# Patient Record
Sex: MALE | Race: WHITE | NOT HISPANIC OR LATINO | URBAN - METROPOLITAN AREA
[De-identification: names, ages, dates, MRNs, and addresses within clinical notes are randomized per-mention and may not be internally consistent; named-entity substitution may affect disease eponyms.]

---

## 2024-08-21 ENCOUNTER — APPOINTMENT (OUTPATIENT)
Dept: OPHTHALMOLOGY | Facility: CLINIC | Age: 44
End: 2024-08-21

## 2024-08-21 ENCOUNTER — PREP FOR PROCEDURE (OUTPATIENT)
Dept: OPHTHALMOLOGY | Facility: CLINIC | Age: 44
End: 2024-08-21

## 2024-08-21 DIAGNOSIS — H50.00 ESOTROPIA OF BOTH EYES: Primary | ICD-10-CM

## 2024-08-21 DIAGNOSIS — H50.00 ESOTROPIA: Primary | ICD-10-CM

## 2024-08-21 DIAGNOSIS — H50.9 UNSPECIFIED STRABISMUS: ICD-10-CM

## 2024-08-21 DIAGNOSIS — Z98.890 HISTORY OF STRABISMUS SURGERY: ICD-10-CM

## 2024-08-21 PROCEDURE — 92060 SENSORIMOTOR EXAMINATION: CPT | Performed by: OPHTHALMOLOGY

## 2024-08-21 PROCEDURE — 99204 OFFICE O/P NEW MOD 45 MIN: CPT | Performed by: OPHTHALMOLOGY

## 2024-08-21 RX ORDER — MINOXIDIL 2.5 MG/1
2.5 TABLET ORAL DAILY
COMMUNITY

## 2024-08-21 RX ORDER — CETIRIZINE HYDROCHLORIDE 5 MG/1
TABLET, CHEWABLE ORAL DAILY
COMMUNITY

## 2024-08-21 RX ORDER — SODIUM CHLORIDE, SODIUM LACTATE, POTASSIUM CHLORIDE, CALCIUM CHLORIDE 600; 310; 30; 20 MG/100ML; MG/100ML; MG/100ML; MG/100ML
20 INJECTION, SOLUTION INTRAVENOUS CONTINUOUS
OUTPATIENT
Start: 2024-08-21

## 2024-08-21 ASSESSMENT — VISUAL ACUITY
METHOD: SNELLEN - LINEAR
OD_SC: 20/20
OS_SC: 20/20
OD_SC: 20/20
OS_SC: 20/25

## 2024-08-21 ASSESSMENT — CONF VISUAL FIELD
OD_NORMAL: 1
OS_SUPERIOR_TEMPORAL_RESTRICTION: 0
OD_SUPERIOR_TEMPORAL_RESTRICTION: 0
OS_NORMAL: 1
OD_INFERIOR_TEMPORAL_RESTRICTION: 0
OD_SUPERIOR_NASAL_RESTRICTION: 0
OD_INFERIOR_NASAL_RESTRICTION: 0
OS_INFERIOR_TEMPORAL_RESTRICTION: 0
OS_INFERIOR_NASAL_RESTRICTION: 0
METHOD: COUNTING FINGERS
OS_SUPERIOR_NASAL_RESTRICTION: 0

## 2024-08-21 ASSESSMENT — REFRACTION_MANIFEST
OD_SPHERE: +0.25
OS_AXIS: 165
METHOD_AUTOREFRACTION: 1
OD_AXIS: 095
OD_CYLINDER: +0.50
OS_SPHERE: PLANO
OS_CYLINDER: +0.75

## 2024-08-21 ASSESSMENT — TONOMETRY
OD_IOP_MMHG: 12
OS_IOP_MMHG: 12
IOP_METHOD: I-CARE

## 2024-08-21 ASSESSMENT — ENCOUNTER SYMPTOMS
RESPIRATORY NEGATIVE: 0
EYES NEGATIVE: 1
NEUROLOGICAL NEGATIVE: 0
MUSCULOSKELETAL NEGATIVE: 0
ALLERGIC/IMMUNOLOGIC NEGATIVE: 0
CONSTITUTIONAL NEGATIVE: 0
PSYCHIATRIC NEGATIVE: 0
CARDIOVASCULAR NEGATIVE: 0
ENDOCRINE NEGATIVE: 0
HEMATOLOGIC/LYMPHATIC NEGATIVE: 0
GASTROINTESTINAL NEGATIVE: 0

## 2024-08-21 ASSESSMENT — SLIT LAMP EXAM - LIDS
COMMENTS: NORMAL
COMMENTS: NORMAL

## 2024-08-21 ASSESSMENT — EXTERNAL EXAM - LEFT EYE: OS_EXAM: NORMAL

## 2024-08-21 ASSESSMENT — EXTERNAL EXAM - RIGHT EYE: OD_EXAM: NORMAL

## 2024-08-21 NOTE — PROGRESS NOTES
1. Esotropia        2. Unspecified strabismus  Referral to Pediatric Ophthalmology      3. History of strabismus surgery          Mike Cortes is a 43 y.o. male [facility].    He has a previous h/o strabismus surgery in 90's and re-operation of the right eye in 2017 with Dr Baldwin (operative note scanned in the chart) - The right medial rectus (RMR) was found to be 2.50 mm recessed and was re-recessed 4.00 more mms    He remains to have small angle esotropia with R eye preference   We discussed observation vs eye muscle surgery  He would like to proceed with eye muscle surgery     I reviewed the risks and benefits of the proposed strabismus surgery including the possibility of over or undercorrection and the potential need for reoperation in the near or distant future.  I discussed the possible lack of binocular vision despite surgery and the possibility of postoperative diplopia. There is a chance that glasses or prisms could be necessary in the postoperative period.   I emphasized the risk of double vision and he shows understanding.    I also discussed the risks of infection, hemorrhage, loss of vision or complications from general anesthesia and other surgical imponderables.    All questions were reviewed and answered.    We will plan for OS (Left eye) exploration medial rectus re-recession vs lateral rectus resection

## 2024-08-23 PROBLEM — H50.00 ESOTROPIA OF BOTH EYES: Status: ACTIVE | Noted: 2024-08-21

## 2024-08-27 RX ORDER — METOPROLOL SUCCINATE 100 MG/1
100 TABLET, EXTENDED RELEASE ORAL
COMMUNITY
Start: 2023-02-15 | End: 2024-08-27 | Stop reason: ALTCHOICE

## 2024-08-27 RX ORDER — CYCLOBENZAPRINE HCL 10 MG
1 TABLET ORAL 2 TIMES DAILY PRN
COMMUNITY
Start: 2020-09-05 | End: 2024-08-27 | Stop reason: ALTCHOICE

## 2024-09-13 ENCOUNTER — ANESTHESIA EVENT (OUTPATIENT)
Dept: OPERATING ROOM | Facility: CLINIC | Age: 44
End: 2024-09-13
Payer: OTHER GOVERNMENT

## 2024-09-15 NOTE — H&P
History Of Present Illness  Mike Cortes is a 43 y.o. male presenting with esotropia.     Past Medical History  He has a past medical history of Heart failure with mildly reduced ejection fraction (HFmrEF) (Multi), NICM (nonischemic cardiomyopathy) (Multi), SOB (shortness of breath) on exertion, and Strabismus.    Surgical History  He has a past surgical history that includes Strabismus surgery (2017) and Tenotomy (Left).     Social History  He reports that he has never smoked. He has never used smokeless tobacco. He reports current alcohol use. No history on file for drug use.     Allergies  Codeine    ROS  Patient denies ocular pain, redness, discharge, decreased vision, blind spots, flashes, or floaters.     Physical exam:  Head: normocephalic  Eyes: see clinic note  Respiratory: per anesthesia  Cardiovascular: per anesthesia  Neuro: alert and interactive for age       Assessment/Plan   Assessment & Plan  Esotropia of both eyes      Plan for eye muscle surgery left eye - medial rectus exploration with medial rectus re-recession vs lateral rectus resection.          Hossein Draper MD

## 2024-09-15 NOTE — DISCHARGE INSTRUCTIONS
1. No swimming or pooled water to eye for two weeks, ok to shower  2. Please use Maxitrol eye drops in the left eye 4 times per day until the bottle finishes or you are told to stop using the drops.   3. Tylenol/ibuprofen as needed for pain  4. Follow up with ophthalmology on 9/24/24 at 3:50 pm

## 2024-09-16 ENCOUNTER — HOSPITAL ENCOUNTER (OUTPATIENT)
Facility: CLINIC | Age: 44
Setting detail: OUTPATIENT SURGERY
Discharge: HOME | End: 2024-09-16
Attending: OPHTHALMOLOGY | Admitting: OPHTHALMOLOGY
Payer: OTHER GOVERNMENT

## 2024-09-16 ENCOUNTER — ANESTHESIA (OUTPATIENT)
Dept: OPERATING ROOM | Facility: CLINIC | Age: 44
End: 2024-09-16
Payer: OTHER GOVERNMENT

## 2024-09-16 VITALS
WEIGHT: 174.82 LBS | RESPIRATION RATE: 18 BRPM | HEIGHT: 69 IN | SYSTOLIC BLOOD PRESSURE: 122 MMHG | OXYGEN SATURATION: 98 % | HEART RATE: 84 BPM | DIASTOLIC BLOOD PRESSURE: 76 MMHG | BODY MASS INDEX: 25.89 KG/M2 | TEMPERATURE: 97.2 F

## 2024-09-16 DIAGNOSIS — H50.00 ESOTROPIA OF BOTH EYES: Primary | ICD-10-CM

## 2024-09-16 PROCEDURE — 3600000008 HC OR TIME - EACH INCREMENTAL 1 MINUTE - PROCEDURE LEVEL THREE: Performed by: OPHTHALMOLOGY

## 2024-09-16 PROCEDURE — 7100000002 HC RECOVERY ROOM TIME - EACH INCREMENTAL 1 MINUTE: Performed by: OPHTHALMOLOGY

## 2024-09-16 PROCEDURE — 2500000001 HC RX 250 WO HCPCS SELF ADMINISTERED DRUGS (ALT 637 FOR MEDICARE OP): Performed by: ANESTHESIOLOGY

## 2024-09-16 PROCEDURE — A67311 PR STABISMUS SURG,ONE HORIZ MUSCLE: Performed by: ANESTHESIOLOGIST ASSISTANT

## 2024-09-16 PROCEDURE — 7100000010 HC PHASE TWO TIME - EACH INCREMENTAL 1 MINUTE: Performed by: OPHTHALMOLOGY

## 2024-09-16 PROCEDURE — 3600000003 HC OR TIME - INITIAL BASE CHARGE - PROCEDURE LEVEL THREE: Performed by: OPHTHALMOLOGY

## 2024-09-16 PROCEDURE — A67311 PR STABISMUS SURG,ONE HORIZ MUSCLE: Performed by: ANESTHESIOLOGY

## 2024-09-16 PROCEDURE — 7100000001 HC RECOVERY ROOM TIME - INITIAL BASE CHARGE: Performed by: OPHTHALMOLOGY

## 2024-09-16 PROCEDURE — 2500000005 HC RX 250 GENERAL PHARMACY W/O HCPCS: Performed by: OPHTHALMOLOGY

## 2024-09-16 PROCEDURE — 3700000001 HC GENERAL ANESTHESIA TIME - INITIAL BASE CHARGE: Performed by: OPHTHALMOLOGY

## 2024-09-16 PROCEDURE — 2500000001 HC RX 250 WO HCPCS SELF ADMINISTERED DRUGS (ALT 637 FOR MEDICARE OP): Performed by: OPHTHALMOLOGY

## 2024-09-16 PROCEDURE — 3700000002 HC GENERAL ANESTHESIA TIME - EACH INCREMENTAL 1 MINUTE: Performed by: OPHTHALMOLOGY

## 2024-09-16 PROCEDURE — 2500000004 HC RX 250 GENERAL PHARMACY W/ HCPCS (ALT 636 FOR OP/ED): Performed by: OPHTHALMOLOGY

## 2024-09-16 PROCEDURE — 2500000005 HC RX 250 GENERAL PHARMACY W/O HCPCS: Performed by: ANESTHESIOLOGIST ASSISTANT

## 2024-09-16 PROCEDURE — 7100000009 HC PHASE TWO TIME - INITIAL BASE CHARGE: Performed by: OPHTHALMOLOGY

## 2024-09-16 PROCEDURE — 67311 REVISE EYE MUSCLE: CPT | Performed by: OPHTHALMOLOGY

## 2024-09-16 PROCEDURE — 2500000004 HC RX 250 GENERAL PHARMACY W/ HCPCS (ALT 636 FOR OP/ED): Performed by: ANESTHESIOLOGIST ASSISTANT

## 2024-09-16 RX ORDER — PROPOFOL 10 MG/ML
INJECTION, EMULSION INTRAVENOUS AS NEEDED
Status: DISCONTINUED | OUTPATIENT
Start: 2024-09-16 | End: 2024-09-16

## 2024-09-16 RX ORDER — FENTANYL CITRATE 50 UG/ML
25 INJECTION, SOLUTION INTRAMUSCULAR; INTRAVENOUS EVERY 5 MIN PRN
Status: DISCONTINUED | OUTPATIENT
Start: 2024-09-16 | End: 2024-09-16 | Stop reason: HOSPADM

## 2024-09-16 RX ORDER — NEOMYCIN SULFATE, POLYMYXIN B SULFATE AND DEXAMETHASONE 3.5; 10000; 1 MG/ML; [USP'U]/ML; MG/ML
SUSPENSION/ DROPS OPHTHALMIC AS NEEDED
Status: DISCONTINUED | OUTPATIENT
Start: 2024-09-16 | End: 2024-09-16 | Stop reason: HOSPADM

## 2024-09-16 RX ORDER — ONDANSETRON HYDROCHLORIDE 2 MG/ML
INJECTION, SOLUTION INTRAVENOUS AS NEEDED
Status: DISCONTINUED | OUTPATIENT
Start: 2024-09-16 | End: 2024-09-16

## 2024-09-16 RX ORDER — LABETALOL HYDROCHLORIDE 5 MG/ML
5 INJECTION, SOLUTION INTRAVENOUS ONCE AS NEEDED
Status: DISCONTINUED | OUTPATIENT
Start: 2024-09-16 | End: 2024-09-16 | Stop reason: HOSPADM

## 2024-09-16 RX ORDER — LIDOCAINE IN NACL,ISO-OSMOT/PF 30 MG/3 ML
0.1 SYRINGE (ML) INJECTION ONCE
Status: DISCONTINUED | OUTPATIENT
Start: 2024-09-16 | End: 2024-09-16 | Stop reason: HOSPADM

## 2024-09-16 RX ORDER — FENTANYL CITRATE 50 UG/ML
INJECTION, SOLUTION INTRAMUSCULAR; INTRAVENOUS AS NEEDED
Status: DISCONTINUED | OUTPATIENT
Start: 2024-09-16 | End: 2024-09-16

## 2024-09-16 RX ORDER — ACETAMINOPHEN 325 MG/1
650 TABLET ORAL EVERY 4 HOURS PRN
Status: DISCONTINUED | OUTPATIENT
Start: 2024-09-16 | End: 2024-09-16 | Stop reason: HOSPADM

## 2024-09-16 RX ORDER — ONDANSETRON HYDROCHLORIDE 2 MG/ML
4 INJECTION, SOLUTION INTRAVENOUS ONCE AS NEEDED
Status: DISCONTINUED | OUTPATIENT
Start: 2024-09-16 | End: 2024-09-16 | Stop reason: HOSPADM

## 2024-09-16 RX ORDER — PHENYLEPHRINE HYDROCHLORIDE 25 MG/ML
SOLUTION/ DROPS OPHTHALMIC AS NEEDED
Status: DISCONTINUED | OUTPATIENT
Start: 2024-09-16 | End: 2024-09-16 | Stop reason: HOSPADM

## 2024-09-16 RX ORDER — KETOROLAC TROMETHAMINE 30 MG/ML
INJECTION, SOLUTION INTRAMUSCULAR; INTRAVENOUS AS NEEDED
Status: DISCONTINUED | OUTPATIENT
Start: 2024-09-16 | End: 2024-09-16

## 2024-09-16 RX ORDER — SODIUM CHLORIDE, SODIUM LACTATE, POTASSIUM CHLORIDE, CALCIUM CHLORIDE 600; 310; 30; 20 MG/100ML; MG/100ML; MG/100ML; MG/100ML
20 INJECTION, SOLUTION INTRAVENOUS CONTINUOUS
Status: DISCONTINUED | OUTPATIENT
Start: 2024-09-16 | End: 2024-09-16 | Stop reason: HOSPADM

## 2024-09-16 RX ORDER — OXYCODONE HYDROCHLORIDE 5 MG/1
5 TABLET ORAL EVERY 6 HOURS PRN
Status: DISCONTINUED | OUTPATIENT
Start: 2024-09-16 | End: 2024-09-16 | Stop reason: HOSPADM

## 2024-09-16 RX ORDER — WATER 1 ML/ML
IRRIGANT IRRIGATION AS NEEDED
Status: DISCONTINUED | OUTPATIENT
Start: 2024-09-16 | End: 2024-09-16 | Stop reason: HOSPADM

## 2024-09-16 RX ORDER — POVIDONE-IODINE 5 %
SOLUTION, NON-ORAL OPHTHALMIC (EYE) AS NEEDED
Status: DISCONTINUED | OUTPATIENT
Start: 2024-09-16 | End: 2024-09-16 | Stop reason: HOSPADM

## 2024-09-16 RX ORDER — SODIUM CHLORIDE, SODIUM LACTATE, POTASSIUM CHLORIDE, CALCIUM CHLORIDE 600; 310; 30; 20 MG/100ML; MG/100ML; MG/100ML; MG/100ML
100 INJECTION, SOLUTION INTRAVENOUS CONTINUOUS
Status: DISCONTINUED | OUTPATIENT
Start: 2024-09-16 | End: 2024-09-16 | Stop reason: HOSPADM

## 2024-09-16 RX ORDER — PHENYLEPHRINE HCL IN 0.9% NACL 0.4MG/10ML
SYRINGE (ML) INTRAVENOUS AS NEEDED
Status: DISCONTINUED | OUTPATIENT
Start: 2024-09-16 | End: 2024-09-16

## 2024-09-16 RX ORDER — METOCLOPRAMIDE HYDROCHLORIDE 5 MG/ML
10 INJECTION INTRAMUSCULAR; INTRAVENOUS ONCE AS NEEDED
Status: DISCONTINUED | OUTPATIENT
Start: 2024-09-16 | End: 2024-09-16 | Stop reason: HOSPADM

## 2024-09-16 RX ORDER — FENTANYL CITRATE 50 UG/ML
50 INJECTION, SOLUTION INTRAMUSCULAR; INTRAVENOUS EVERY 5 MIN PRN
Status: DISCONTINUED | OUTPATIENT
Start: 2024-09-16 | End: 2024-09-16 | Stop reason: HOSPADM

## 2024-09-16 RX ORDER — MIDAZOLAM HYDROCHLORIDE 1 MG/ML
INJECTION, SOLUTION INTRAMUSCULAR; INTRAVENOUS AS NEEDED
Status: DISCONTINUED | OUTPATIENT
Start: 2024-09-16 | End: 2024-09-16

## 2024-09-16 RX ORDER — GLYCOPYRROLATE 0.2 MG/ML
INJECTION INTRAMUSCULAR; INTRAVENOUS AS NEEDED
Status: DISCONTINUED | OUTPATIENT
Start: 2024-09-16 | End: 2024-09-16

## 2024-09-16 RX ORDER — LIDOCAINE HYDROCHLORIDE 20 MG/ML
INJECTION, SOLUTION INFILTRATION; PERINEURAL AS NEEDED
Status: DISCONTINUED | OUTPATIENT
Start: 2024-09-16 | End: 2024-09-16

## 2024-09-16 RX ORDER — TETRACAINE HYDROCHLORIDE 5 MG/ML
SOLUTION OPHTHALMIC AS NEEDED
Status: DISCONTINUED | OUTPATIENT
Start: 2024-09-16 | End: 2024-09-16 | Stop reason: HOSPADM

## 2024-09-16 RX ORDER — ALBUTEROL SULFATE 0.83 MG/ML
2.5 SOLUTION RESPIRATORY (INHALATION) ONCE AS NEEDED
Status: DISCONTINUED | OUTPATIENT
Start: 2024-09-16 | End: 2024-09-16 | Stop reason: HOSPADM

## 2024-09-16 ASSESSMENT — PAIN SCALES - GENERAL
PAINLEVEL_OUTOF10: 5 - MODERATE PAIN
PAINLEVEL_OUTOF10: 2
PAINLEVEL_OUTOF10: 0 - NO PAIN
PAINLEVEL_OUTOF10: 4
PAINLEVEL_OUTOF10: 0 - NO PAIN

## 2024-09-16 ASSESSMENT — PAIN - FUNCTIONAL ASSESSMENT
PAIN_FUNCTIONAL_ASSESSMENT: 0-10

## 2024-09-16 ASSESSMENT — COLUMBIA-SUICIDE SEVERITY RATING SCALE - C-SSRS
2. HAVE YOU ACTUALLY HAD ANY THOUGHTS OF KILLING YOURSELF?: NO
6. HAVE YOU EVER DONE ANYTHING, STARTED TO DO ANYTHING, OR PREPARED TO DO ANYTHING TO END YOUR LIFE?: NO
1. IN THE PAST MONTH, HAVE YOU WISHED YOU WERE DEAD OR WISHED YOU COULD GO TO SLEEP AND NOT WAKE UP?: NO

## 2024-09-16 ASSESSMENT — PAIN DESCRIPTION - DESCRIPTORS
DESCRIPTORS: ACHING
DESCRIPTORS: DULL
DESCRIPTORS: ACHING

## 2024-09-16 NOTE — ANESTHESIA PROCEDURE NOTES
Airway  Date/Time: 9/16/2024 9:02 AM  Urgency: elective    Airway not difficult    Staffing  Performed: DARWIN   Authorized by: Linda López DO    Performed by: DARWIN Cole  Patient location during procedure: OR    Indications and Patient Condition  Indications for airway management: anesthesia  Spontaneous Ventilation: absent  Sedation level: deep  Preoxygenated: yes  Patient position: sniffing  Mask difficulty assessment: 1 - vent by mask    Final Airway Details  Final airway type: supraglottic airway      Successful airway: flexible  Size 4     Number of attempts at approach: 1

## 2024-09-16 NOTE — ANESTHESIA PREPROCEDURE EVALUATION
Patient: Mike Cortes    Procedure Information       Date/Time: 09/16/24 0905    Procedure: Eye muscle surgery left eye (Left)    Location: [facility] [place] [facility] OR 03 / Virtual [facility] OR    Surgeons: Trell Khanna MD            Relevant Problems   Anesthesia (within normal limits)      Cardiac (within normal limits)  Slightly lower EF      Pulmonary (within normal limits)      Neuro (within normal limits)      GI (within normal limits)      /Renal (within normal limits)      Liver (within normal limits)      Endocrine (within normal limits)      Hematology (within normal limits)      Musculoskeletal (within normal limits)      HEENT (within normal limits)      ID (within normal limits)      Skin (within normal limits)      GYN (within normal limits)       Clinical information reviewed:   Tobacco  Allergies  Meds   Med Hx  Surg Hx   Fam Hx  Soc Hx        NPO Detail:  NPO/Void Status  Date of Last Liquid: 09/15/24  Time of Last Liquid: 2300  Date of Last Solid: 09/15/24  Time of Last Solid: 2300  Time of Last Void: 0817         Physical Exam    Airway  Mallampati: II  TM distance: >3 FB  Neck ROM: full     Cardiovascular    Dental - normal exam     Pulmonary    Abdominal        Anesthesia Plan    History of general anesthesia?: yes  History of complications of general anesthesia?: no    ASA 2     general     intravenous induction   Anesthetic plan and risks discussed with patient.    Plan discussed with CAA.

## 2024-09-16 NOTE — OP NOTE
Eye muscle surgery left eye-lateral rectus recession (L) Operative Note     Date: 2024  OR Location: [facility] [place]    Name: Mike Cortes, : 1980, Age: 43 y.o., MRN: [id number], Sex: male    Diagnosis  Pre-op Diagnosis      * Esotropia of both eyes [H50.00] Post-op Diagnosis     * Esotropia of both eyes [H50.00]     Procedures  1- Exploration, right medial rectus muscle  2- Lateral rectus resection, left eye 5.00mm      Surgeons      * Trell Khanna - Primary    Resident/Fellow/Other Assistant:  Hossein Draper MD    Procedure Summary  Anesthesia: General  ASA: II  Anesthesia Staff: Anesthesiologist: Linda López DO  C-AA: DARWIN Cole    Estimated Blood Loss: <1 mL    Intra-op Medications:   Administrations occurring from 0905 to 1020 on 24:   Medication Name Total Dose   balanced salts (BSS) intraocular solution 15 mL   povidone-iodine 5 % ophthalmic solution 1 Application   tetracaine (PF) 0.5 % ophthalmic solution 1 drop   phenylephrine (Mydfrin) 2.5 % ophthalmic solution 1 drop   sterile water irrigation solution 100 mL   neomycin-polymyxin-dexAMETHasone (Maxitrol) 3.5mg/mL-10,000 unit/mL-0.1 % ophthalmic suspension 1 drop            Anesthesia Record               Intraprocedure I/O Totals       None           Specimen: No specimens collected     Staff:   Circulator: Qing  Scrub Person: Cecilia    Drains and/or Catheters: * None in log *    Tourniquet Times:       Implants:     Findings: Left medial rectuswas found  to be 10 mm posterior from limbus; normal ductions    Indications:   Mike Cortes is an 43 y.o. male who is having surgery for Esotropia of both eyes [H50.00].   The patient and family were seen in the preoperative area.   The risks, benefits, complications, treatment options, non-operative alternatives, expected recovery and outcomes were discussed.  The family concurred with the proposed plan, giving informed consent.    The site of surgery was properly  noted/marked per policy.   The patient has been actively warmed in preoperative area.   Preoperative antibiotics are not indicated.   Venous thrombosis prophylaxis are not indicated.      Procedure Details:   The patient was brought to the operating room and was placed in a supine position.   After the patient was positively identified through a typical time-out procedure, the patient received anesthesia and an LMA.   The left eye was prepped and draped in the usual sterile ophthalmic fashion.   Attention was directed to the left eye. An inferonasal fornix conjunctival incision was performed and medial rectus muscle was identified and hooked. The muscle insertion was measured to be at 10 mm from the limbus.   Attention was directed to the lateral portion of the left eye where an inferotemporal fornix conjunctival incision was performed. Then the lateral rectus was identified and freed from the soft surrounding tissues. The muscle was secured with a locking bite at the center 5 mm away from the original insertion using a 6-0 polysorb suture and using calipers to  the distance. The suture was weaved superiorly and inferiorly with a locking bite at both borders. The muscle was then clamped with a hemostat immediately anterior to the suture. Bipolar cautery was then used along the hemostat to disinsert the muscle from the globe. The hemostat was released. The remaining muscle stump was then trimmed flush with the globe using Frank scissors. The muscle was reinserted back on the globe at the original insertion.   The conjunctiva was then closed with single 8-0 polysorb suture in a buried fashion.   At the end, the left eye was cleaned. Tetracaine, Maxitrol, and 5% betadine eye solution were instilled in the eye.  The patient was then awakened and the LMA was removed.   The patient was transferred to the recover room in good and stable condition.   The patient tolerated the procedure and the anesthesia well.      Complications:  None; patient tolerated the procedure well.    Disposition: PACU - hemodynamically stable.  Condition: stable     Attending Attestation: I was present and scrubbed for the entirety of the procedure.     MD Trell Urrutia  Phone Number: [phone]

## 2024-09-16 NOTE — ANESTHESIA POSTPROCEDURE EVALUATION
Patient: Mike Cortes    Procedure Summary       Date: 09/16/24 Room / Location: [facility] [place] [facility] 03 / Virtual [facility] [place] [facility]    Anesthesia Start: 0857 Anesthesia Stop: 1001    Procedure: Eye muscle surgery left eye-lateral rectus recession (Left: Eye) Diagnosis:       Esotropia of both eyes      (Esotropia of both eyes [H50.00])    Surgeons: Trell Khanna MD Responsible Provider: Linda López DO    Anesthesia Type: general ASA Status: 2            Anesthesia Type: general    Vitals Value Taken Time   /90 09/16/24 1020   Temp 36.4 °C (97.5 °F) 09/16/24 1015   Pulse 91 09/16/24 1020   Resp 18 09/16/24 1020   SpO2 99 % 09/16/24 1020       Anesthesia Post Evaluation    Patient location during evaluation: PACU  Patient participation: complete - patient participated  Level of consciousness: awake  Pain management: satisfactory to patient  Multimodal analgesia pain management approach  Airway patency: patent  Cardiovascular status: acceptable  Respiratory status: acceptable  Hydration status: acceptable  Postoperative Nausea and Vomiting: none    There were no known notable events for this encounter.

## 2024-09-17 ASSESSMENT — PAIN SCALES - GENERAL: PAINLEVEL_OUTOF10: 0 - NO PAIN

## 2024-09-24 ENCOUNTER — APPOINTMENT (OUTPATIENT)
Dept: OPHTHALMOLOGY | Facility: CLINIC | Age: 44
End: 2024-09-24
Payer: OTHER GOVERNMENT

## 2024-09-24 DIAGNOSIS — Z98.890 HISTORY OF STRABISMUS SURGERY: ICD-10-CM

## 2024-09-24 DIAGNOSIS — H50.00 ESOTROPIA: Primary | ICD-10-CM

## 2024-09-24 PROCEDURE — 1036F TOBACCO NON-USER: CPT | Performed by: OPHTHALMOLOGY

## 2024-09-24 PROCEDURE — 99024 POSTOP FOLLOW-UP VISIT: CPT | Performed by: OPHTHALMOLOGY

## 2024-09-24 ASSESSMENT — EXTERNAL EXAM - LEFT EYE: OS_EXAM: NORMAL FOR AGE

## 2024-09-24 ASSESSMENT — VISUAL ACUITY
METHOD: SNELLEN - LINEAR
OS_SC+: -2
OD_SC: 20/20
OS_SC: 20/20 TESTED FIRST

## 2024-09-24 ASSESSMENT — ENCOUNTER SYMPTOMS
ENDOCRINE NEGATIVE: 0
EYES NEGATIVE: 1
GASTROINTESTINAL NEGATIVE: 0
MUSCULOSKELETAL NEGATIVE: 0
RESPIRATORY NEGATIVE: 0
ALLERGIC/IMMUNOLOGIC NEGATIVE: 0
CARDIOVASCULAR NEGATIVE: 0
CONSTITUTIONAL NEGATIVE: 0
PSYCHIATRIC NEGATIVE: 0
HEMATOLOGIC/LYMPHATIC NEGATIVE: 0
NEUROLOGICAL NEGATIVE: 0

## 2024-09-24 ASSESSMENT — EXTERNAL EXAM - RIGHT EYE: OD_EXAM: NORMAL FOR AGE

## 2024-09-24 ASSESSMENT — SLIT LAMP EXAM - LIDS
COMMENTS: NORMAL
COMMENTS: NORMAL

## 2024-09-24 NOTE — PROGRESS NOTES
Mike is a 43 y.o. here for;    1. Esotropia        2. History of strabismus surgery          Status post (s/p) Lateral rectus resection, left eye 5.00mm 9/16/24    Patient healing well, without signs of active infection.   Post-op precautions reviewed, we will taper the Maxitrol drops (gtts) and stop within 4 days.  We will follow patient as planned in 6-8 weeks, sooner prn

## 2024-11-26 ENCOUNTER — APPOINTMENT (OUTPATIENT)
Dept: OPHTHALMOLOGY | Facility: CLINIC | Age: 44
End: 2024-11-26
Payer: OTHER GOVERNMENT

## 2024-11-26 DIAGNOSIS — Z98.890 HISTORY OF STRABISMUS SURGERY: ICD-10-CM

## 2024-11-26 DIAGNOSIS — H50.00 ESOTROPIA: Primary | ICD-10-CM

## 2024-11-26 PROCEDURE — 99024 POSTOP FOLLOW-UP VISIT: CPT | Performed by: OPHTHALMOLOGY

## 2024-11-26 ASSESSMENT — CONF VISUAL FIELD
OD_SUPERIOR_NASAL_RESTRICTION: 0
OS_INFERIOR_TEMPORAL_RESTRICTION: 0
OS_SUPERIOR_NASAL_RESTRICTION: 0
OS_SUPERIOR_TEMPORAL_RESTRICTION: 0
OD_INFERIOR_NASAL_RESTRICTION: 0
METHOD: COUNTING FINGERS
OS_NORMAL: 1
OD_INFERIOR_TEMPORAL_RESTRICTION: 0
OS_INFERIOR_NASAL_RESTRICTION: 0
OD_NORMAL: 1
OD_SUPERIOR_TEMPORAL_RESTRICTION: 0

## 2024-11-26 ASSESSMENT — ENCOUNTER SYMPTOMS
PSYCHIATRIC NEGATIVE: 0
CARDIOVASCULAR NEGATIVE: 0
GASTROINTESTINAL NEGATIVE: 0
EYES NEGATIVE: 1
HEMATOLOGIC/LYMPHATIC NEGATIVE: 0
CONSTITUTIONAL NEGATIVE: 0
MUSCULOSKELETAL NEGATIVE: 0
NEUROLOGICAL NEGATIVE: 0
ENDOCRINE NEGATIVE: 0
ALLERGIC/IMMUNOLOGIC NEGATIVE: 0
RESPIRATORY NEGATIVE: 0

## 2024-11-26 ASSESSMENT — VISUAL ACUITY
METHOD: SNELLEN - LINEAR
OD_SC: 20/25+
OS_SC: 20/20
OS_SC+: -1
OD_SC: 20/20
OS_SC: 20/25+

## 2024-11-26 ASSESSMENT — SLIT LAMP EXAM - LIDS
COMMENTS: NORMAL
COMMENTS: NORMAL

## 2024-11-26 ASSESSMENT — EXTERNAL EXAM - RIGHT EYE: OD_EXAM: NORMAL FOR AGE

## 2024-11-26 ASSESSMENT — EXTERNAL EXAM - LEFT EYE: OS_EXAM: NORMAL FOR AGE

## 2024-11-26 NOTE — PROGRESS NOTES
1. Esotropia        2. History of strabismus surgery          Mike Cortes is a 44 y.o. 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

## 2024-12-19 ENCOUNTER — PREP FOR PROCEDURE (OUTPATIENT)
Dept: OPHTHALMOLOGY | Facility: HOSPITAL | Age: 44
End: 2024-12-19
Payer: OTHER GOVERNMENT

## 2024-12-19 DIAGNOSIS — H50.00 ESOTROPIA: Primary | ICD-10-CM

## 2024-12-19 NOTE — H&P
History Of Present Illness  Mike Cortes is a 44 y.o. male with esotropia and history of strabismus surgery presenting with moderate angle esotropia at near.      Past Medical History  He has a past medical history of Heart failure with mildly reduced ejection fraction (HFmrEF), NICM (nonischemic cardiomyopathy) (Multi), SOB (shortness of breath) on exertion, and Strabismus.    Surgical History  He has a past surgical history that includes Strabismus surgery (2017); Tenotomy (Left); and Strabismus surgery (Left, 09/16/2024).     Social History  He reports that he has never smoked. He has never used smokeless tobacco. He reports current alcohol use. He reports that he does not use drugs.     Allergies  Codeine    ROS  Patient denies ocular pain, redness, discharge, decreased vision, double vision, blind spots, flashes, or floaters.     Physical Exam  Not recorded          Assessment/Plan   Problem List Items Addressed This Visit             ICD-10-CM       Eye/Vision problems    Esotropia - Primary H50.00       - Plan for RLRs vs PFS OU for previously recessed medial rectus muscle vs other eye muscle surgery in one or both eyes.       Cruz Razo MD

## 2025-01-17 ENCOUNTER — APPOINTMENT (OUTPATIENT)
Dept: OPHTHALMOLOGY | Facility: CLINIC | Age: 45
End: 2025-01-17
Payer: OTHER GOVERNMENT

## 2025-01-17 DIAGNOSIS — H50.00 ESOTROPIA: Primary | ICD-10-CM

## 2025-01-17 DIAGNOSIS — Z98.890 HISTORY OF STRABISMUS SURGERY: ICD-10-CM

## 2025-01-17 PROCEDURE — 99213 OFFICE O/P EST LOW 20 MIN: CPT | Performed by: OPHTHALMOLOGY

## 2025-01-17 PROCEDURE — 92060 SENSORIMOTOR EXAMINATION: CPT | Performed by: OPHTHALMOLOGY

## 2025-01-17 ASSESSMENT — CONF VISUAL FIELD
OS_INFERIOR_NASAL_RESTRICTION: 0
OD_NORMAL: 1
OS_INFERIOR_TEMPORAL_RESTRICTION: 0
OS_SUPERIOR_TEMPORAL_RESTRICTION: 0
OS_SUPERIOR_NASAL_RESTRICTION: 0
OD_INFERIOR_NASAL_RESTRICTION: 0
OD_INFERIOR_TEMPORAL_RESTRICTION: 0
OS_NORMAL: 1
OD_SUPERIOR_NASAL_RESTRICTION: 0
OD_SUPERIOR_TEMPORAL_RESTRICTION: 0
METHOD: COUNTING FINGERS

## 2025-01-17 ASSESSMENT — SLIT LAMP EXAM - LIDS
COMMENTS: NORMAL
COMMENTS: NORMAL

## 2025-01-17 ASSESSMENT — ENCOUNTER SYMPTOMS
HEMATOLOGIC/LYMPHATIC NEGATIVE: 0
ENDOCRINE NEGATIVE: 0
GASTROINTESTINAL NEGATIVE: 0
MUSCULOSKELETAL NEGATIVE: 0
NEUROLOGICAL NEGATIVE: 0
CARDIOVASCULAR NEGATIVE: 0
RESPIRATORY NEGATIVE: 0
ALLERGIC/IMMUNOLOGIC NEGATIVE: 0
PSYCHIATRIC NEGATIVE: 0
EYES NEGATIVE: 1
CONSTITUTIONAL NEGATIVE: 0

## 2025-01-17 ASSESSMENT — EXTERNAL EXAM - RIGHT EYE: OD_EXAM: NORMAL FOR AGE

## 2025-01-17 ASSESSMENT — VISUAL ACUITY
METHOD: SNELLEN - LINEAR
OS_SC: 20/20
OD_SC: 20/20

## 2025-01-17 ASSESSMENT — EXTERNAL EXAM - LEFT EYE: OS_EXAM: NORMAL FOR AGE

## 2025-01-17 NOTE — PROGRESS NOTES
Pt already scheduled for surgery.   I reviewed the risks and benefits of the proposed strabismus surgery including the possibility of over or undercorrection and the potential need for reoperation in the near or distant future.  I discussed the possible lack of binocular vision despite surgery and the possibility of postoperative diplopia.  There is a chance that glasses or prisms could be necessary in the postoperative period.  I also discussed the risks of infection, hemorrhage, loss of vision or complications from general anesthesia and other surgical imponderables.  All questions were reviewed and answered.

## 2025-01-31 ENCOUNTER — ANESTHESIA EVENT (OUTPATIENT)
Dept: OPERATING ROOM | Facility: CLINIC | Age: 45
End: 2025-01-31
Payer: OTHER GOVERNMENT

## 2025-02-03 ENCOUNTER — HOSPITAL ENCOUNTER (OUTPATIENT)
Facility: CLINIC | Age: 45
Setting detail: OUTPATIENT SURGERY
Discharge: HOME | End: 2025-02-03
Attending: OPHTHALMOLOGY | Admitting: OPHTHALMOLOGY
Payer: OTHER GOVERNMENT

## 2025-02-03 ENCOUNTER — ANESTHESIA (OUTPATIENT)
Dept: OPERATING ROOM | Facility: CLINIC | Age: 45
End: 2025-02-03
Payer: OTHER GOVERNMENT

## 2025-02-03 VITALS
BODY MASS INDEX: 27.09 KG/M2 | OXYGEN SATURATION: 96 % | SYSTOLIC BLOOD PRESSURE: 120 MMHG | TEMPERATURE: 97.5 F | DIASTOLIC BLOOD PRESSURE: 70 MMHG | RESPIRATION RATE: 16 BRPM | HEART RATE: 81 BPM | WEIGHT: 183.42 LBS

## 2025-02-03 PROCEDURE — 67311 REVISE EYE MUSCLE: CPT | Performed by: OPHTHALMOLOGY

## 2025-02-03 PROCEDURE — 2500000005 HC RX 250 GENERAL PHARMACY W/O HCPCS: Performed by: OPHTHALMOLOGY

## 2025-02-03 PROCEDURE — A67314 PR STABISMUS SURG,ONE VERT MUSCLE: Performed by: ANESTHESIOLOGY

## 2025-02-03 PROCEDURE — 3700000002 HC GENERAL ANESTHESIA TIME - EACH INCREMENTAL 1 MINUTE: Performed by: OPHTHALMOLOGY

## 2025-02-03 PROCEDURE — 7100000009 HC PHASE TWO TIME - INITIAL BASE CHARGE: Performed by: OPHTHALMOLOGY

## 2025-02-03 PROCEDURE — 3700000001 HC GENERAL ANESTHESIA TIME - INITIAL BASE CHARGE: Performed by: OPHTHALMOLOGY

## 2025-02-03 PROCEDURE — 3600000003 HC OR TIME - INITIAL BASE CHARGE - PROCEDURE LEVEL THREE: Performed by: OPHTHALMOLOGY

## 2025-02-03 PROCEDURE — 2500000004 HC RX 250 GENERAL PHARMACY W/ HCPCS (ALT 636 FOR OP/ED): Performed by: ANESTHESIOLOGIST ASSISTANT

## 2025-02-03 PROCEDURE — 2500000001 HC RX 250 WO HCPCS SELF ADMINISTERED DRUGS (ALT 637 FOR MEDICARE OP): Performed by: STUDENT IN AN ORGANIZED HEALTH CARE EDUCATION/TRAINING PROGRAM

## 2025-02-03 PROCEDURE — A67314 PR STABISMUS SURG,ONE VERT MUSCLE: Performed by: ANESTHESIOLOGIST ASSISTANT

## 2025-02-03 PROCEDURE — 2500000001 HC RX 250 WO HCPCS SELF ADMINISTERED DRUGS (ALT 637 FOR MEDICARE OP): Performed by: ANESTHESIOLOGY

## 2025-02-03 PROCEDURE — 7100000002 HC RECOVERY ROOM TIME - EACH INCREMENTAL 1 MINUTE: Performed by: OPHTHALMOLOGY

## 2025-02-03 PROCEDURE — 3600000008 HC OR TIME - EACH INCREMENTAL 1 MINUTE - PROCEDURE LEVEL THREE: Performed by: OPHTHALMOLOGY

## 2025-02-03 PROCEDURE — 7100000010 HC PHASE TWO TIME - EACH INCREMENTAL 1 MINUTE: Performed by: OPHTHALMOLOGY

## 2025-02-03 PROCEDURE — 7100000001 HC RECOVERY ROOM TIME - INITIAL BASE CHARGE: Performed by: OPHTHALMOLOGY

## 2025-02-03 RX ORDER — MIDAZOLAM HYDROCHLORIDE 1 MG/ML
INJECTION, SOLUTION INTRAMUSCULAR; INTRAVENOUS AS NEEDED
Status: DISCONTINUED | OUTPATIENT
Start: 2025-02-03 | End: 2025-02-03

## 2025-02-03 RX ORDER — TETRACAINE HYDROCHLORIDE 5 MG/ML
SOLUTION OPHTHALMIC AS NEEDED
Status: DISCONTINUED | OUTPATIENT
Start: 2025-02-03 | End: 2025-02-03 | Stop reason: HOSPADM

## 2025-02-03 RX ORDER — SODIUM CHLORIDE, SODIUM LACTATE, POTASSIUM CHLORIDE, CALCIUM CHLORIDE 600; 310; 30; 20 MG/100ML; MG/100ML; MG/100ML; MG/100ML
75 INJECTION, SOLUTION INTRAVENOUS CONTINUOUS
Status: DISCONTINUED | OUTPATIENT
Start: 2025-02-03 | End: 2025-02-03 | Stop reason: HOSPADM

## 2025-02-03 RX ORDER — PROPOFOL 10 MG/ML
INJECTION, EMULSION INTRAVENOUS AS NEEDED
Status: DISCONTINUED | OUTPATIENT
Start: 2025-02-03 | End: 2025-02-03

## 2025-02-03 RX ORDER — PHENYLEPHRINE HYDROCHLORIDE 25 MG/ML
SOLUTION/ DROPS OPHTHALMIC AS NEEDED
Status: DISCONTINUED | OUTPATIENT
Start: 2025-02-03 | End: 2025-02-03 | Stop reason: HOSPADM

## 2025-02-03 RX ORDER — KETOROLAC TROMETHAMINE 30 MG/ML
INJECTION, SOLUTION INTRAMUSCULAR; INTRAVENOUS AS NEEDED
Status: DISCONTINUED | OUTPATIENT
Start: 2025-02-03 | End: 2025-02-03

## 2025-02-03 RX ORDER — ALBUTEROL SULFATE 0.83 MG/ML
2.5 SOLUTION RESPIRATORY (INHALATION) ONCE AS NEEDED
Status: DISCONTINUED | OUTPATIENT
Start: 2025-02-03 | End: 2025-02-03 | Stop reason: HOSPADM

## 2025-02-03 RX ORDER — ACETAMINOPHEN 325 MG/1
TABLET ORAL AS NEEDED
Status: DISCONTINUED | OUTPATIENT
Start: 2025-02-03 | End: 2025-02-03

## 2025-02-03 RX ORDER — FENTANYL CITRATE 50 UG/ML
INJECTION, SOLUTION INTRAMUSCULAR; INTRAVENOUS AS NEEDED
Status: DISCONTINUED | OUTPATIENT
Start: 2025-02-03 | End: 2025-02-03

## 2025-02-03 RX ORDER — OXYCODONE HYDROCHLORIDE 5 MG/1
5 TABLET ORAL EVERY 4 HOURS PRN
Status: DISCONTINUED | OUTPATIENT
Start: 2025-02-03 | End: 2025-02-03 | Stop reason: HOSPADM

## 2025-02-03 RX ORDER — WATER 1 ML/ML
IRRIGANT IRRIGATION AS NEEDED
Status: DISCONTINUED | OUTPATIENT
Start: 2025-02-03 | End: 2025-02-03 | Stop reason: HOSPADM

## 2025-02-03 RX ORDER — NEOMYCIN SULFATE, POLYMYXIN B SULFATE AND DEXAMETHASONE 3.5; 10000; 1 MG/ML; [USP'U]/ML; MG/ML
SUSPENSION/ DROPS OPHTHALMIC AS NEEDED
Status: DISCONTINUED | OUTPATIENT
Start: 2025-02-03 | End: 2025-02-03 | Stop reason: HOSPADM

## 2025-02-03 RX ORDER — GLYCOPYRROLATE 0.2 MG/ML
INJECTION INTRAMUSCULAR; INTRAVENOUS AS NEEDED
Status: DISCONTINUED | OUTPATIENT
Start: 2025-02-03 | End: 2025-02-03

## 2025-02-03 RX ORDER — METOCLOPRAMIDE HYDROCHLORIDE 5 MG/ML
10 INJECTION INTRAMUSCULAR; INTRAVENOUS ONCE AS NEEDED
Status: DISCONTINUED | OUTPATIENT
Start: 2025-02-03 | End: 2025-02-03 | Stop reason: HOSPADM

## 2025-02-03 RX ORDER — GABAPENTIN 300 MG/1
CAPSULE ORAL AS NEEDED
Status: DISCONTINUED | OUTPATIENT
Start: 2025-02-03 | End: 2025-02-03

## 2025-02-03 RX ORDER — ONDANSETRON HYDROCHLORIDE 2 MG/ML
INJECTION, SOLUTION INTRAVENOUS AS NEEDED
Status: DISCONTINUED | OUTPATIENT
Start: 2025-02-03 | End: 2025-02-03

## 2025-02-03 RX ORDER — HYDROMORPHONE HYDROCHLORIDE 1 MG/ML
0.2 INJECTION, SOLUTION INTRAMUSCULAR; INTRAVENOUS; SUBCUTANEOUS EVERY 5 MIN PRN
Status: DISCONTINUED | OUTPATIENT
Start: 2025-02-03 | End: 2025-02-03 | Stop reason: HOSPADM

## 2025-02-03 RX ORDER — POVIDONE-IODINE 5 %
SOLUTION, NON-ORAL OPHTHALMIC (EYE) AS NEEDED
Status: DISCONTINUED | OUTPATIENT
Start: 2025-02-03 | End: 2025-02-03 | Stop reason: HOSPADM

## 2025-02-03 RX ORDER — LIDOCAINE HYDROCHLORIDE 20 MG/ML
INJECTION, SOLUTION INFILTRATION; PERINEURAL AS NEEDED
Status: DISCONTINUED | OUTPATIENT
Start: 2025-02-03 | End: 2025-02-03

## 2025-02-03 RX ORDER — ONDANSETRON HYDROCHLORIDE 2 MG/ML
4 INJECTION, SOLUTION INTRAVENOUS ONCE AS NEEDED
Status: DISCONTINUED | OUTPATIENT
Start: 2025-02-03 | End: 2025-02-03 | Stop reason: HOSPADM

## 2025-02-03 RX ORDER — LIDOCAINE HYDROCHLORIDE 10 MG/ML
0.1 INJECTION, SOLUTION EPIDURAL; INFILTRATION; INTRACAUDAL; PERINEURAL ONCE
Status: DISCONTINUED | OUTPATIENT
Start: 2025-02-03 | End: 2025-02-03 | Stop reason: HOSPADM

## 2025-02-03 RX ADMIN — ACETAMINOPHEN 975 MG: 325 TABLET, FILM COATED ORAL at 10:43

## 2025-02-03 RX ADMIN — ONDANSETRON 4 MG: 2 INJECTION INTRAMUSCULAR; INTRAVENOUS at 12:04

## 2025-02-03 RX ADMIN — OXYCODONE HYDROCHLORIDE 5 MG: 5 TABLET ORAL at 13:13

## 2025-02-03 RX ADMIN — FENTANYL CITRATE 100 MCG: 50 INJECTION, SOLUTION INTRAMUSCULAR; INTRAVENOUS at 11:37

## 2025-02-03 RX ADMIN — KETOROLAC TROMETHAMINE 30 MG: 30 INJECTION, SOLUTION INTRAMUSCULAR; INTRAVENOUS at 12:04

## 2025-02-03 RX ADMIN — DEXAMETHASONE SODIUM PHOSPHATE 4 MG: 4 INJECTION INTRA-ARTICULAR; INTRALESIONAL; INTRAMUSCULAR; INTRAVENOUS; SOFT TISSUE at 11:43

## 2025-02-03 RX ADMIN — GLYCOPYRROLATE 0.2 MG: 0.2 INJECTION INTRAMUSCULAR; INTRAVENOUS at 11:43

## 2025-02-03 RX ADMIN — PROPOFOL 100 MCG/KG/MIN: 10 INJECTION, EMULSION INTRAVENOUS at 11:40

## 2025-02-03 RX ADMIN — LIDOCAINE HYDROCHLORIDE 100 MG: 20 INJECTION, SOLUTION INFILTRATION; PERINEURAL at 11:37

## 2025-02-03 RX ADMIN — GABAPENTIN 300 MG: 300 CAPSULE ORAL at 10:43

## 2025-02-03 RX ADMIN — SODIUM CHLORIDE, POTASSIUM CHLORIDE, SODIUM LACTATE AND CALCIUM CHLORIDE: 600; 310; 30; 20 INJECTION, SOLUTION INTRAVENOUS at 11:32

## 2025-02-03 RX ADMIN — MIDAZOLAM HYDROCHLORIDE 2 MG: 1 INJECTION, SOLUTION INTRAMUSCULAR; INTRAVENOUS at 11:32

## 2025-02-03 RX ADMIN — PROPOFOL 200 MG: 10 INJECTION, EMULSION INTRAVENOUS at 11:37

## 2025-02-03 SDOH — HEALTH STABILITY: MENTAL HEALTH: CURRENT SMOKER: 0

## 2025-02-03 ASSESSMENT — PAIN - FUNCTIONAL ASSESSMENT
PAIN_FUNCTIONAL_ASSESSMENT: 0-10

## 2025-02-03 ASSESSMENT — COLUMBIA-SUICIDE SEVERITY RATING SCALE - C-SSRS
2. HAVE YOU ACTUALLY HAD ANY THOUGHTS OF KILLING YOURSELF?: NO
1. IN THE PAST MONTH, HAVE YOU WISHED YOU WERE DEAD OR WISHED YOU COULD GO TO SLEEP AND NOT WAKE UP?: NO
6. HAVE YOU EVER DONE ANYTHING, STARTED TO DO ANYTHING, OR PREPARED TO DO ANYTHING TO END YOUR LIFE?: NO

## 2025-02-03 ASSESSMENT — ENCOUNTER SYMPTOMS
PSYCHIATRIC NEGATIVE: 1
CONSTITUTIONAL NEGATIVE: 1
CARDIOVASCULAR NEGATIVE: 1
RESPIRATORY NEGATIVE: 1

## 2025-02-03 ASSESSMENT — PAIN DESCRIPTION - DESCRIPTORS
DESCRIPTORS: ACHING
DESCRIPTORS: ACHING

## 2025-02-03 ASSESSMENT — PAIN SCALES - GENERAL
PAINLEVEL_OUTOF10: 3
PAINLEVEL_OUTOF10: 0 - NO PAIN
PAINLEVEL_OUTOF10: 4
PAINLEVEL_OUTOF10: 4

## 2025-02-03 NOTE — ANESTHESIA PROCEDURE NOTES
Airway  Date/Time: 2/3/2025 11:39 AM  Urgency: elective    Airway not difficult    Staffing  Performed: DARWIN   Authorized by: Jacinto De La Cruz MD    Performed by: DARWIN Dean  Patient location during procedure: OR    Indications and Patient Condition  Indications for airway management: anesthesia and airway protection  Spontaneous ventilation: present  Sedation level: deep  Preoxygenated: yes  Patient position: sniffing  MILS maintained throughout  Mask difficulty assessment: 1 - vent by mask    Final Airway Details  Final airway type: supraglottic airway      Successful airway: classic  Size 4     Number of attempts at approach: 1  Number of other approaches attempted: 0

## 2025-02-03 NOTE — OP NOTE
Eye Muscle Surgery One or Both Eyes Operative Note     Date: 2/3/2025  OR Location: [facility] OR    Name: Mike Cortes, : 1980, Age: 44 y.o., MRN: [id number], Sex: male    Diagnosis  Pre-op Diagnosis      * Esotropia [H50.00] Post-op Diagnosis     * Esotropia [H50.00]     Procedures  Right lateral rectus (RLR) resection 7.5 mm    Surgeons      * Hero Baldwin - Primary    Resident/Fellow/Other Assistant:  Surgeons and Role:  * No surgeons found with a matching role *  Vic Avila MD    Staff:   Circulator: Jelani Linares Person: Nano Main Circulator: Breanne Main Scrub: Amanda    Anesthesia Staff: Anesthesiologist: Jacinto De La Cruz MD  C-AA: DARWIN Dean    Procedure Summary  Anesthesia: Anesthesia type not filed in the log.  ASA: II  Estimated Blood Loss: <5mL  Intra-op Medications:   Administrations occurring from 1115 to 1230 on 25:   Medication Name Total Dose   povidone-iodine 5 % ophthalmic solution 2 Application   sterile water irrigation solution 225 mL   balanced salts (BSS) intraocular solution 0.5 mL   phenylephrine (Mydfrin) 2.5 % ophthalmic solution 1 drop   neomycin-polymyxin-dexAMETHasone (Maxitrol) 3.5mg/mL-10,000 unit/mL-0.1 % ophthalmic suspension 2 drop   tetracaine (Altacaine) 0.5 % ophthalmic solution 2 drop   dexAMETHasone (Decadron) 4 mg/mL 4 mg   fentaNYL PF 0.05 mg/mL 100 mcg   glycopyrrolate (Robinul) injection 0.2 mg   ketorolac (Toradol) 30 mg 30 mg   LR bolus Cannot be calculated   lidocaine (Xylocaine) injection 2 % 100 mg   midazolam (Versed) 1 mg/1 mL 2 mg   ondansetron 2 mg/mL 4 mg   propofol (Diprivan) injection 10 mg/mL 405.92 mg              Anesthesia Record               Intraprocedure I/O Totals       None           Specimen: No specimens collected              Drains and/or Catheters: * None in log *    Tourniquet Times: NA        Implants: none    Findings: normal ductions and anatomy    Indications: Mike Cortes is an 44  y.o. male who is having surgery for Esotropia [H50.00].     The patient was seen in the preoperative area. The risks, benefits, complications, treatment options, non-operative alternatives, expected recovery and outcomes were discussed with the patient. The possibilities of reaction to medication, pulmonary aspiration, injury to surrounding structures, bleeding, recurrent infection, the need for additional procedures, failure to diagnose a condition, and creating a complication requiring transfusion or operation were discussed with the patient. The patient concurred with the proposed plan, giving informed consent.  The site of surgery was properly noted/marked if necessary per policy. The patient has been actively warmed in preoperative area. Preoperative antibiotics are not indicated. Venous thrombosis prophylaxis are not indicated.    Procedure Details:   The patient was brought to the operating room and was placed in a supine position.   After the patient was positively identified through a typical time-out procedure, the patient received anesthesia and an LMA.   Then the right eye was prepped and draped in the usual sterile ophthalmic fashion.     Attention was then directed to the right eye in which an inferotemporal fornix conjunctival incision was performed. Then the lateral rectus was identified and freed from the soft surrounding tissues. The muscle was secured with a locking bite at the center 7.5 mm away from the original insertion using a 6-0 polysorb suture and using calipers to  the distance. The suture was weaved superiorly and inferiorly with a locking bite at both borders. The muscle was then clamped with a hemostat immediately anterior to the suture. Bipolar cautery was then used along the hemostat to disinsert the muscle from the globe. The hemostat was released. The remaining muscle stump was then trimmed flush with the globe using Frank scissors. The muscle was reinserted back on the  globe at the original insertion. The conjunctiva was then closed with 2 interrupted 8-0 polysorb sutures in a buried fashion.   The right eye was cleaned. Tetracaine and 5% betadine eye solution were instilled in the eye. Maxitrol drops were instilled in the eye.  The patient was then awakened and the LMA was removed.   The patient was transferred to the recover room in good and stable condition.   The patient tolerated the procedure and the anesthesia well.     Complications:  None; patient tolerated the procedure well.    Disposition: PACU - hemodynamically stable.  Condition: stable         Attending Attestation:     Hero Baldwin  Phone Number: [phone]

## 2025-02-03 NOTE — ANESTHESIA PREPROCEDURE EVALUATION
Patient: Mike Cortes    Procedure Information       Date/Time: 02/03/25 1115    Procedure: Eye Muscle Surgery One or Both Eyes (Eye)    Location: [facility] OR 03 / Virtual [facility] OR    Surgeons: Hero Baldwin MD            Relevant Problems   Anesthesia (within normal limits)      Cardiac (within normal limits)      Pulmonary (within normal limits)      Neuro (within normal limits)      GI (within normal limits)      /Renal (within normal limits)      Liver (within normal limits)      Endocrine (within normal limits)      Hematology (within normal limits)       Clinical information reviewed:    Allergies  Meds               NPO Detail:  NPO/Void Status  Date of Last Liquid: 02/02/25  Time of Last Liquid: 2330  Date of Last Solid: 02/02/25  Time of Last Solid: 2300         Physical Exam    Airway  Mallampati: I  Neck ROM: full     Cardiovascular   Rhythm: regular     Dental    Pulmonary - normal exam     Abdominal            Anesthesia Plan    History of general anesthesia?: yes  History of complications of general anesthesia?: no    ASA 2     general     The patient is not a current smoker.    intravenous induction   Anesthetic plan and risks discussed with patient.    Plan discussed with resident.

## 2025-02-03 NOTE — H&P
History Of Present Illness  Mike Cortes is a 44 y.o. male presenting with esotropia here for one or both eye muscle surgery.     Past Medical History  Past Medical History:   Diagnosis Date    Heart failure with mildly reduced ejection fraction (HFmrEF)     Cardiology just monitoring now    NICM (nonischemic cardiomyopathy) (Multi)     Cardiology just monitoring now    SOB (shortness of breath) on exertion     resolved    Strabismus        Surgical History  Past Surgical History:   Procedure Laterality Date    STRABISMUS SURGERY  2017    STRABISMUS SURGERY Left 09/16/2024    1- Exploration, right medial rectus muscle 2- Lateral rectus resection, left eye 5.00mm    TENOTOMY Left     elbow        Social History  He reports that he has never smoked. He has never been exposed to tobacco smoke. He has never used smokeless tobacco. He reports current alcohol use. He reports that he does not use drugs.    Family History  No family history on file.     Allergies  Codeine    Review of Systems   Constitutional: Negative.    Respiratory: Negative.     Cardiovascular: Negative.    Psychiatric/Behavioral: Negative.          Physical Exam  Constitutional:       Appearance: Normal appearance.   Eyes:      Comments: esotropia   Cardiovascular:      Comments: Warm, well perfused  Pulmonary:      Effort: Pulmonary effort is normal.   Neurological:      Mental Status: He is alert and oriented to person, place, and time.   Psychiatric:         Mood and Affect: Mood normal.         Behavior: Behavior normal.          Last Recorded Vitals  Blood pressure 134/77, pulse 82, temperature 36.6 °C (97.9 °F), temperature source Temporal, resp. rate 16, weight 83.2 kg (183 lb 6.8 oz), SpO2 96%.    Relevant Results         Assessment/Plan   Assessment & Plan  Esotropia           Mike Cortes is a 44 y.o. male presenting with esotropia here for one or both eye muscle surgery.      Vic Avila MD

## 2025-02-03 NOTE — ANESTHESIA POSTPROCEDURE EVALUATION
Patient: Mike Cortes    Procedure Summary       Date: 02/03/25 Room / Location: [facility] OR 03 / [facility]    Anesthesia Start: 1132 Anesthesia Stop: 1220    Procedure: Eye Muscle Surgery One or Both Eyes (Eye) Diagnosis:       Esotropia      (Esotropia [H50.00])    Surgeons: Hero Baldwin MD Responsible Provider: Jacinto De La Cruz MD    Anesthesia Type: general ASA Status: 2            Anesthesia Type: general    Vitals Value Taken Time   BP 98/68 02/03/25 1221   Temp 36.  3 02/03/25 1221   Pulse 78 02/03/25 1221   Resp 16 02/03/25 1221   SpO2 99 02/03/25 1221       Anesthesia Post Evaluation    Patient location during evaluation: PACU  Patient participation: complete - patient participated  Level of consciousness: sleepy but conscious  Pain management: adequate  Airway patency: patent  Cardiovascular status: acceptable  Respiratory status: acceptable  Hydration status: acceptable  Postoperative Nausea and Vomiting: none        No notable events documented.

## 2025-02-03 NOTE — DISCHARGE INSTRUCTIONS
1. No swimming or pooled water to eye for two weeks, ok to shower  2. No eye patch  3. Start using maxitrol eye drops 4x/day in the right eye for the next 2 weeks  4. Tylenol/ibuprofen as needed for pain  May have Tylenol after: 4:45pm  May have Ibuprofen/advil/motrin/aleve after: 6:00pm      5 Follow up with ophthalmology on 2/7/25 as scheduled at 3:20 PM at [facility] eye [facility] ([facility]) with Dr. Larios    Please call if you have any questions or problems - day or night.  Dr. Baldwin [phone] or [phone]  After hours call [phone] and ask for peds ophthalmology resident on call     General Information  Bloody tears may ooze out of the eyes for 1 - 2 days. A small amount of bleeding from the nose may occur. You may wipe the eye carefully with facial tissue.  Don't be concerned about the position of the eyes immediately after surgery. It can take several weeks for the eye position to stabilize.  Discomfort or slight pain with eye movements as well as mild eye lid swelling can be seen and it tends to get better with time.  In the morning, the eyelids may be stuck together for the first day following surgery. Gently pull the lids apart. If this is too difficult, then apply a warm moist wash cloth for a few minutes to the area and try again.  Hair can be washed any time following surgery taking extra care to keep eyes dry at all times.    Activity  Don't drive for 48 hours after surgery or if you are experiencing double vision.  The anesthetic can affect coordination and judgment for 48 hours.  The patient may resume normal activities, including watching television as tolerated. Sports, exercise and swimming should be avoided for ten (10) days.    Diet  Avoid alcoholic beverages for 48 hours.  A normal diet may be resumed after a liquid diet is tolerated without nausea. Advance diet slowly with only bland (BRAT diet - bananas, rice, applesauce, toast) foods the first day.    Medications  You may take Tylenol  or prescribed medication for discomfort. Avoid Aspirin and/or aspirin-related products, such as Advil and Motrin.  If eye drops are given or prescribed, please use them as directed.   Resume Home Medications.  Follow-up Appointment  1. Follow-up appointment as directed.  After Hours, Weekends and Holidays call [phone] and ask for Pediatric Ophthalmologist.

## 2025-02-03 NOTE — BRIEF OP NOTE
Date: 2/3/2025  [place] [facility] [facility]    Name: Mike Cortes, : 1980, Age: 44 y.o., MRN: [id number], Sex: male    Diagnosis  Pre-op Diagnosis      * Esotropia [H50.00] Post-op Diagnosis     * Esotropia [H50.00]     Procedures  Right lateral rectus (RLR) resection 7.5 mm    Surgeons      * Hero Baldwin - Primary    Resident/Fellow/Other Assistant:  Surgeons and Role:  * No surgeons found with a matching role *  Vic Avila MD    Staff:   Circulator: Jelani  Scrub Person: Nano Main Circulator: Breanne Main Scrub: Amanda    Anesthesia Staff: Anesthesiologist: Jacinto De La Cruz MD  C-AA: DARWIN Dean    Procedure Summary  Anesthesia: Anesthesia type not filed in the log.  ASA: II  Estimated Blood Loss: <5mL  Intra-op Medications:   Administrations occurring from 1115 to 1230 on 25:   Medication Name Total Dose   povidone-iodine 5 % ophthalmic solution 2 Application   sterile water irrigation solution 225 mL   balanced salts (BSS) intraocular solution 0.5 mL   phenylephrine (Mydfrin) 2.5 % ophthalmic solution 1 drop   neomycin-polymyxin-dexAMETHasone (Maxitrol) 3.5mg/mL-10,000 unit/mL-0.1 % ophthalmic suspension 2 drop   tetracaine (Altacaine) 0.5 % ophthalmic solution 2 drop   dexAMETHasone (Decadron) 4 mg/mL 4 mg   fentaNYL PF 0.05 mg/mL 100 mcg   glycopyrrolate (Robinul) injection 0.2 mg   ketorolac (Toradol) 30 mg 30 mg   LR bolus Cannot be calculated   lidocaine (Xylocaine) injection 2 % 100 mg   midazolam (Versed) 1 mg/1 mL 2 mg   ondansetron 2 mg/mL 4 mg   propofol (Diprivan) injection 10 mg/mL 405.92 mg              Anesthesia Record               Intraprocedure I/O Totals       None           Specimen: No specimens collected               Findings: normal ductions and anatomy    Complications:  None; patient tolerated the procedure well.     Disposition: PACU - hemodynamically stable.  Condition: stable  Specimens Collected: No specimens collected  Attending  Attestation:     Hero Baldwin  Phone Number: [phone]

## 2025-02-07 ENCOUNTER — OFFICE VISIT (OUTPATIENT)
Dept: OPHTHALMOLOGY | Facility: HOSPITAL | Age: 45
End: 2025-02-07
Payer: OTHER GOVERNMENT

## 2025-02-07 DIAGNOSIS — H50.00 ESOTROPIA: ICD-10-CM

## 2025-02-07 DIAGNOSIS — Z98.890 HISTORY OF STRABISMUS SURGERY: ICD-10-CM

## 2025-02-07 DIAGNOSIS — H50.9 UNSPECIFIED STRABISMUS: Primary | ICD-10-CM

## 2025-02-07 PROCEDURE — 99211 OFF/OP EST MAY X REQ PHY/QHP: CPT | Performed by: OPHTHALMOLOGY

## 2025-02-07 ASSESSMENT — CONF VISUAL FIELD
OD_INFERIOR_TEMPORAL_RESTRICTION: 0
OS_NORMAL: 1
OD_SUPERIOR_TEMPORAL_RESTRICTION: 0
OS_SUPERIOR_TEMPORAL_RESTRICTION: 0
OD_NORMAL: 1
OS_INFERIOR_NASAL_RESTRICTION: 0
OS_INFERIOR_TEMPORAL_RESTRICTION: 0
OD_INFERIOR_NASAL_RESTRICTION: 0
OD_SUPERIOR_NASAL_RESTRICTION: 0
OS_SUPERIOR_NASAL_RESTRICTION: 0
METHOD: COUNTING FINGERS

## 2025-02-07 ASSESSMENT — SLIT LAMP EXAM - LIDS
COMMENTS: NORMAL
COMMENTS: NORMAL

## 2025-02-07 ASSESSMENT — ENCOUNTER SYMPTOMS
ALLERGIC/IMMUNOLOGIC NEGATIVE: 0
CARDIOVASCULAR NEGATIVE: 0
NEUROLOGICAL NEGATIVE: 0
CONSTITUTIONAL NEGATIVE: 0
HEMATOLOGIC/LYMPHATIC NEGATIVE: 0
EYES NEGATIVE: 1
MUSCULOSKELETAL NEGATIVE: 0
PSYCHIATRIC NEGATIVE: 0
ENDOCRINE NEGATIVE: 0
RESPIRATORY NEGATIVE: 0
GASTROINTESTINAL NEGATIVE: 0

## 2025-02-07 ASSESSMENT — VISUAL ACUITY
OD_SC: 20/20
OD_SC: 20/30
OS_SC: 20/20
OS_SC: 20/25
OD_SC+: -1
METHOD: SNELLEN - LINEAR

## 2025-02-07 ASSESSMENT — EXTERNAL EXAM - RIGHT EYE: OD_EXAM: NORMAL FOR AGE

## 2025-02-07 ASSESSMENT — EXTERNAL EXAM - LEFT EYE: OS_EXAM: NORMAL FOR AGE

## 2025-04-23 ENCOUNTER — APPOINTMENT (OUTPATIENT)
Dept: OPHTHALMOLOGY | Facility: HOSPITAL | Age: 45
End: 2025-04-23
Payer: OTHER GOVERNMENT

## 2025-04-23 DIAGNOSIS — H50.00 ESOTROPIA: Primary | ICD-10-CM

## 2025-04-23 DIAGNOSIS — Z98.890 HISTORY OF STRABISMUS SURGERY: ICD-10-CM

## 2025-04-23 PROCEDURE — 99211 OFF/OP EST MAY X REQ PHY/QHP: CPT | Performed by: OPHTHALMOLOGY

## 2025-04-23 ASSESSMENT — ENCOUNTER SYMPTOMS
GASTROINTESTINAL NEGATIVE: 0
MUSCULOSKELETAL NEGATIVE: 0
PSYCHIATRIC NEGATIVE: 0
ENDOCRINE NEGATIVE: 0
ALLERGIC/IMMUNOLOGIC NEGATIVE: 0
CONSTITUTIONAL NEGATIVE: 0
NEUROLOGICAL NEGATIVE: 0
RESPIRATORY NEGATIVE: 0
CARDIOVASCULAR NEGATIVE: 0
HEMATOLOGIC/LYMPHATIC NEGATIVE: 0
EYES NEGATIVE: 1

## 2025-04-23 ASSESSMENT — VISUAL ACUITY
OS_SC+: -3
OD_SC: 20/15
OS_SC: 20/15
OD_SC+: -1
METHOD: SNELLEN - LINEAR

## 2025-04-23 ASSESSMENT — SLIT LAMP EXAM - LIDS
COMMENTS: NORMAL
COMMENTS: NORMAL

## 2025-04-23 ASSESSMENT — EXTERNAL EXAM - LEFT EYE: OS_EXAM: NORMAL FOR AGE

## 2025-04-23 ASSESSMENT — EXTERNAL EXAM - RIGHT EYE: OD_EXAM: NORMAL FOR AGE

## 2025-04-23 NOTE — PROGRESS NOTES
RM (1990s), repeat [facility] (2017), LLRs (9/16/24), RLRs, (2/3/25)    Excellent alignment at distance and remains to have moderate angle esotropia at near  Discussed further options of observation w readers for near work or eye muscle surgery  The risk of diplopia postoperatively is also discussed  Patient to consider options, will contact if he wishes to proceed with surgery  Follow up PRN

## (undated) DEVICE — CONTAINER, SPECIMEN, 4 OZ, OR PEEL PACK, STERILE

## (undated) DEVICE — APPLICATOR, COTTON TIP, 3 IN, WOOD, STERILE

## (undated) DEVICE — Device

## (undated) DEVICE — CORD, CAUTERY, BIOPOLAR FORCEP, 12FT

## (undated) DEVICE — SYRINGE, MONOJECT, LUER LOCK, 3 CC, LF

## (undated) DEVICE — SUTURE, VICRYL, 8-0, 12 IN, TG1406, DA, VIOLET

## (undated) DEVICE — GLOVE, SURGICAL, ENCORE, MICROPTIC, 7.5, PF, LATEX, BROWN

## (undated) DEVICE — CLEANER, WIPE, INSTRUMENT, 3.25 X 3.25 IN

## (undated) DEVICE — DRAPE, SHEET, HEAD/BAR, W/TURBAN, W/ADHESIVE, 43 X 39 IN, LF

## (undated) DEVICE — DRESSING, GAUZE, SPONGE, 12 PLY, 4 X 4 IN, PLASTIC POUCH, STRL 10PK

## (undated) DEVICE — SUTURE, CTD, VICRYL, 6-0, TG1008

## (undated) DEVICE — CORD, BIPOLAR,  12 FT, DISPOSABLE, LF

## (undated) DEVICE — DRESSING, TRANSPARENT, TEGADERM, 2-3/8 X 2-3/4 IN

## (undated) DEVICE — SPONGE, GAUZE, XRAY DECT, 16 PLY, 4 X 4, W/MASTER DMT,STERILE

## (undated) DEVICE — SUTURE, VICRYL, 8-0, 12 IN, TG1408, DA, VIOLET

## (undated) DEVICE — GLOVE, SURGICAL, PROTEXIS PI , 6.0, PF, LF

## (undated) DEVICE — CAUTERY, OPHTHALMIC, BATTERY OPERATED, HI TEMPERATURE, FINE TIP, STERILE

## (undated) DEVICE — PREP TRAY, SKIN, DRY, W/GLOVES

## (undated) DEVICE — DRESSING, GAUZE, SPONGE, 12 PLY, CURITY, 4 X 4 IN, STERILE

## (undated) DEVICE — TOWEL, SURGICAL, NEURO, O/R, 16 X 26, BLUE, STERILE